# Patient Record
(demographics unavailable — no encounter records)

---

## 2017-12-04 DIAGNOSIS — N52.01 ERECTILE DYSFUNCTION DUE TO ARTERIAL INSUFFICIENCY: ICD-10-CM

## 2017-12-04 RX ORDER — TADALAFIL 5 MG/1
5 TABLET ORAL DAILY
Qty: 90 TABLET | Refills: 0 | Status: SHIPPED | OUTPATIENT
Start: 2017-12-04

## 2017-12-04 NOTE — TELEPHONE ENCOUNTER
[facility] phone call message- medication clarification/question:    Full Medication Name: tadalafil (CIALIS) 5 MG tablet    Question: Requesting refill on medication above.     Pharmacy confirmed as [id number] - [place], [place] - [address]  AT [facility]: Yes    OK to leave a message on voice mail? Yes    Primary language: Far      needed? No    Call taken on December 4, 2017 at 10:30 AM by Tracy Martins